# Patient Record
Sex: FEMALE | Race: BLACK OR AFRICAN AMERICAN | NOT HISPANIC OR LATINO | Employment: FULL TIME | URBAN - METROPOLITAN AREA
[De-identification: names, ages, dates, MRNs, and addresses within clinical notes are randomized per-mention and may not be internally consistent; named-entity substitution may affect disease eponyms.]

---

## 2023-10-11 PROBLEM — R11.2 POST-OPERATIVE NAUSEA AND VOMITING: Status: ACTIVE | Noted: 2023-10-11

## 2023-10-11 PROBLEM — G47.33 OBSTRUCTIVE SLEEP APNEA, ADULT: Status: ACTIVE | Noted: 2023-10-11

## 2023-10-11 PROBLEM — E86.0 DEHYDRATION: Status: ACTIVE | Noted: 2023-10-11

## 2023-10-11 PROBLEM — M62.830 BACK MUSCLE SPASM: Status: ACTIVE | Noted: 2023-10-11

## 2023-10-11 PROBLEM — R29.818 SUSPECTED SLEEP APNEA: Status: ACTIVE | Noted: 2023-10-11

## 2023-10-11 PROBLEM — J45.909 ASTHMA (HHS-HCC): Status: ACTIVE | Noted: 2023-10-11

## 2023-10-11 PROBLEM — E78.00 HYPERCHOLESTEROLEMIA: Status: ACTIVE | Noted: 2023-10-11

## 2023-10-11 PROBLEM — Z97.5 NEXPLANON IN PLACE: Status: ACTIVE | Noted: 2023-10-11

## 2023-10-11 PROBLEM — M62.838 NECK MUSCLE SPASM: Status: ACTIVE | Noted: 2023-10-11

## 2023-10-11 PROBLEM — F54 PSYCHOLOGICAL FACTOR AFFECTING PHYSICAL CONDITION: Status: ACTIVE | Noted: 2023-10-11

## 2023-10-11 PROBLEM — E53.8 FOLIC ACID DEFICIENCY: Status: ACTIVE | Noted: 2023-10-11

## 2023-10-11 PROBLEM — Z98.84 HISTORY OF BARIATRIC SURGERY: Status: ACTIVE | Noted: 2023-10-11

## 2023-10-11 PROBLEM — E66.01 MORBID OBESITY (MULTI): Status: ACTIVE | Noted: 2023-10-11

## 2023-10-11 PROBLEM — J30.2 SEASONAL ALLERGIC RHINITIS: Status: ACTIVE | Noted: 2023-10-11

## 2023-10-11 PROBLEM — L73.2 HIDRADENITIS SUPPURATIVA: Status: ACTIVE | Noted: 2023-10-11

## 2023-10-11 PROBLEM — R63.2 POLYPHAGIA: Status: ACTIVE | Noted: 2023-10-11

## 2023-10-11 PROBLEM — I10 HYPERTENSION: Status: ACTIVE | Noted: 2023-10-11

## 2023-10-11 PROBLEM — Z98.890 POST-OPERATIVE NAUSEA AND VOMITING: Status: ACTIVE | Noted: 2023-10-11

## 2023-10-11 PROBLEM — K21.9 GERD (GASTROESOPHAGEAL REFLUX DISEASE): Status: ACTIVE | Noted: 2023-10-11

## 2023-10-11 PROBLEM — E55.9 VITAMIN D DEFICIENCY: Status: ACTIVE | Noted: 2023-10-11

## 2023-10-11 RX ORDER — OMEPRAZOLE 40 MG/1
CAPSULE, DELAYED RELEASE ORAL
COMMUNITY
End: 2024-04-08 | Stop reason: WASHOUT

## 2023-10-11 RX ORDER — ENOXAPARIN SODIUM 100 MG/ML
INJECTION SUBCUTANEOUS
COMMUNITY
End: 2024-04-08 | Stop reason: ALTCHOICE

## 2023-10-11 RX ORDER — NAPROXEN 500 MG/1
500 TABLET ORAL 2 TIMES DAILY
COMMUNITY
Start: 2023-07-24 | End: 2024-04-08 | Stop reason: ALTCHOICE

## 2023-10-11 RX ORDER — MUPIROCIN 20 MG/G
OINTMENT TOPICAL 2 TIMES DAILY
COMMUNITY
Start: 2022-09-12 | End: 2024-04-08 | Stop reason: WASHOUT

## 2023-10-11 RX ORDER — LIDOCAINE 560 MG/1
PATCH PERCUTANEOUS; TOPICAL; TRANSDERMAL DAILY
COMMUNITY
Start: 2023-07-24 | End: 2024-04-08 | Stop reason: ALTCHOICE

## 2023-10-11 RX ORDER — ONDANSETRON 4 MG/1
TABLET, ORALLY DISINTEGRATING ORAL
COMMUNITY
End: 2024-04-08 | Stop reason: ALTCHOICE

## 2023-10-11 RX ORDER — CYCLOBENZAPRINE HCL 10 MG
10 TABLET ORAL EVERY 8 HOURS
COMMUNITY
Start: 2023-07-24 | End: 2024-04-08 | Stop reason: WASHOUT

## 2023-10-11 RX ORDER — ETONOGESTREL 68 MG/1
IMPLANT SUBCUTANEOUS
COMMUNITY

## 2023-10-12 ENCOUNTER — APPOINTMENT (OUTPATIENT)
Dept: DERMATOLOGY | Facility: CLINIC | Age: 28
End: 2023-10-12
Payer: COMMERCIAL

## 2024-04-08 ENCOUNTER — OFFICE VISIT (OUTPATIENT)
Dept: OBSTETRICS AND GYNECOLOGY | Facility: CLINIC | Age: 29
End: 2024-04-08
Payer: COMMERCIAL

## 2024-04-08 VITALS
SYSTOLIC BLOOD PRESSURE: 128 MMHG | HEIGHT: 66 IN | BODY MASS INDEX: 35.36 KG/M2 | DIASTOLIC BLOOD PRESSURE: 84 MMHG | WEIGHT: 220 LBS

## 2024-04-08 DIAGNOSIS — Z01.419 WELL WOMAN EXAM WITH ROUTINE GYNECOLOGICAL EXAM: Primary | ICD-10-CM

## 2024-04-08 DIAGNOSIS — N92.1 BREAKTHROUGH BLEEDING ON NEXPLANON: ICD-10-CM

## 2024-04-08 DIAGNOSIS — Z97.5 BREAKTHROUGH BLEEDING ON NEXPLANON: ICD-10-CM

## 2024-04-08 DIAGNOSIS — N89.8 VAGINAL DISCHARGE: ICD-10-CM

## 2024-04-08 PROCEDURE — 99395 PREV VISIT EST AGE 18-39: CPT | Performed by: OBSTETRICS & GYNECOLOGY

## 2024-04-08 PROCEDURE — 1036F TOBACCO NON-USER: CPT | Performed by: OBSTETRICS & GYNECOLOGY

## 2024-04-08 PROCEDURE — 3079F DIAST BP 80-89 MM HG: CPT | Performed by: OBSTETRICS & GYNECOLOGY

## 2024-04-08 PROCEDURE — 87205 SMEAR GRAM STAIN: CPT

## 2024-04-08 PROCEDURE — 3074F SYST BP LT 130 MM HG: CPT | Performed by: OBSTETRICS & GYNECOLOGY

## 2024-04-08 RX ORDER — POLYETHYLENE GLYCOL 3350 17 G/17G
POWDER, FOR SOLUTION ORAL
COMMUNITY
Start: 2022-12-19

## 2024-04-08 RX ORDER — GUAIFENESIN 1200 MG
650 TABLET, EXTENDED RELEASE 12 HR ORAL EVERY 6 HOURS
COMMUNITY
Start: 2018-11-05

## 2024-04-08 RX ORDER — ASPIRIN 325 MG
TABLET, DELAYED RELEASE (ENTERIC COATED) ORAL
COMMUNITY
Start: 2022-12-20

## 2024-04-08 ASSESSMENT — PAIN SCALES - GENERAL: PAINLEVEL: 0-NO PAIN

## 2024-04-08 NOTE — PROGRESS NOTES
History Of Present Illness  Routine Gyn Exam  Eileen Borjas here for routine WWE. Current complaints: On Nexplanon-- menses are a bit irregular. When she has a period it can last longer than a week.  When the menses lingers it can cause a foul odor. She is not currently sexually active. LMP 3/26/2024,     Gynecologic History  Patient's last menstrual period was 2024 (exact date).  Contraception:  Nexplanon  Last Pap: 2022. Results were: normal  Last mammogram: NA. Results were: NA  Last Colonoscopy:  NA. Results were: NA  Last DEXA: NA. Results were: NA  Lipid/DM: with PCP (last 2022).    Obstetric History  OB History    Para Term  AB Living   1 1 1     1   SAB IAB Ectopic Multiple Live Births           1      # Outcome Date GA Lbr Dc/2nd Weight Sex Delivery Anes PTL Lv   1 Term 2018    F CS-LTranv   NAE        Past Medical History  She has a past medical history of 28 weeks gestation of pregnancy (2018), 34 weeks gestation of pregnancy (10/08/2018), Acute candidiasis of vulva and vagina (10/08/2018), Acute pharyngitis, unspecified (2017), Bariatric surgery status (2022), Encounter for contraceptive management, unspecified (2015), Encounter for examination of blood pressure without abnormal findings (10/01/2018), Encounter for other general counseling and advice on contraception (2017), Encounter for routine postpartum follow-up, Encounter for screening for diabetes mellitus (2018), Encounter for screening for infections with a predominantly sexual mode of transmission (2018), Encounter for supervision of normal first pregnancy, second trimester, Other conditions influencing health status, Other problems related to lifestyle (2015), Other problems related to lifestyle (2018), Otitis media, unspecified, right ear (2017), Personal history of other diseases of the female genital tract, Personal history of other diseases of the  respiratory system (02/21/2017), Personal history of other diseases of the respiratory system (02/21/2017), Personal history of other diseases of the respiratory system, Personal history of other infectious and parasitic diseases (01/31/2014), Personal history of other specified conditions (01/27/2014), Personal history of other specified conditions (01/27/2014), Streptococcal pharyngitis (09/06/2016), Supervision of other high risk pregnancies, third trimester (09/20/2018), and Unspecified abnormal findings in urine (01/27/2014).    Surgical History  She has a past surgical history that includes Other surgical history (06/25/2021); Tonsillectomy (11/03/2014); and Bariatric Surgery (2022).     Social History  She reports that she has never smoked. She has never used smokeless tobacco. She reports current alcohol use. She reports that she does not use drugs.    Family History  No family history on file.     Allergies  Patient has no known allergies.     Chaperone: present  Physical Exam  Constitutional:       Appearance: Normal appearance.   Pulmonary:      Effort: Pulmonary effort is normal.   Chest:   Breasts:     Right: Normal. No mass, nipple discharge or skin change.      Left: Normal. No mass, nipple discharge or skin change.   Abdominal:      General: Abdomen is flat. There is no distension.      Palpations: Abdomen is soft.      Tenderness: There is no abdominal tenderness.   Genitourinary:     Labia:         Right: No rash, tenderness or lesion.         Left: No rash, tenderness or lesion.       Urethra: No prolapse.      Vagina: Vaginal discharge (thcik and white) present. No bleeding.      Cervix: No friability or lesion.      Uterus: Normal. Not enlarged and not tender.       Adnexa:         Right: No mass, tenderness or fullness.          Left: No mass, tenderness or fullness.     Neurological:      General: No focal deficit present.      Mental Status: She is alert.   Psychiatric:         Mood and  "Affect: Mood normal.          Last Recorded Vitals  Blood pressure 128/84, height 1.676 m (5' 6\"), weight 99.8 kg (220 lb), last menstrual period 03/26/2024.    Assessment/Plan   Problem List Items Addressed This Visit    None  Visit Diagnoses       Well woman exam with routine gynecological exam    -  Primary    Breakthrough bleeding on Nexplanon        Vaginal discharge        Relevant Orders    Vaginitis Gram Stain For Bacterial Vaginosis + Yeast            Contraception: Nexplanon  PAP: due 8/2025    Mammogram: NA   Colonoscopy:  NA   DEXA: NA   Lipid/DM: obtained through PCP    Discussed symptoms of vaginal odor and discharge noted on today's exam.  Will send vaginitis panel. Will treat if positive. If negative, will cycle for one month with a AFUA given her frustration with her current bleeding pattern.  We did discuss the common changes in bleeding that can occur with both Nexplanon and rapid weight loss (pt has lost ~150 lbs since her bariatric surgery).       Cr Zhu MD  "

## 2024-04-10 DIAGNOSIS — Z97.5 BREAKTHROUGH BLEEDING ON NEXPLANON: Primary | ICD-10-CM

## 2024-04-10 DIAGNOSIS — N92.1 BREAKTHROUGH BLEEDING ON NEXPLANON: Primary | ICD-10-CM

## 2024-04-10 LAB
CLUE CELLS VAG LPF-#/AREA: NORMAL /[LPF]
NUGENT SCORE: 3
YEAST VAG WET PREP-#/AREA: NORMAL

## 2024-04-10 RX ORDER — NORGESTIMATE AND ETHINYL ESTRADIOL 0.25-0.035
1 KIT ORAL DAILY
Qty: 28 TABLET | Refills: 0 | Status: SHIPPED | OUTPATIENT
Start: 2024-04-10 | End: 2024-05-07

## 2024-04-11 ENCOUNTER — OFFICE VISIT (OUTPATIENT)
Dept: PLASTIC SURGERY | Facility: CLINIC | Age: 29
End: 2024-04-11
Payer: COMMERCIAL

## 2024-04-11 VITALS
HEART RATE: 73 BPM | BODY MASS INDEX: 35.36 KG/M2 | DIASTOLIC BLOOD PRESSURE: 90 MMHG | SYSTOLIC BLOOD PRESSURE: 133 MMHG | HEIGHT: 66 IN | WEIGHT: 220 LBS

## 2024-04-11 DIAGNOSIS — E65 ABDOMINAL PANNUS: ICD-10-CM

## 2024-04-11 DIAGNOSIS — E88.1 LIPODYSTROPHY: Primary | ICD-10-CM

## 2024-04-11 PROCEDURE — 3075F SYST BP GE 130 - 139MM HG: CPT | Performed by: PHYSICIAN ASSISTANT

## 2024-04-11 PROCEDURE — 3080F DIAST BP >= 90 MM HG: CPT | Performed by: PHYSICIAN ASSISTANT

## 2024-04-11 PROCEDURE — 99204 OFFICE O/P NEW MOD 45 MIN: CPT | Performed by: PHYSICIAN ASSISTANT

## 2024-04-15 NOTE — PROGRESS NOTES
Department of Plastic and Reconstructive Surgery       Initial Office Consultation    Date: 24  Referring Provider: Dr. Moscoso  Primary Care Provider: Jenn Mckeon MD    Porfirio Borjas is a 28 y.o. female who was referred by Dr Moscoso  for evaluation of excess skin and tissue s/p bariatric surgery.    She is a relatively healthy 29yo female who presents at the recommendation of her bariatrics doctor for consultation regarding excess skin and tissue s/p bariatric surgery. She underwent gastric sleeve 3/2022. She notes her highest weight prior to surgery was 385lbs, lowest after surgery 215lbs, and her current weight 220lbs. She endorses that she has been stable at her current weight for approx 3-4 months. She notes that she does not have a specific weight loss goal in mind however she notes she is not actively trying to lose any further weight at this time and is stabilized.   She has complaints of excess skin of the abdomen and arms. She notes that the excess skin of her arms and arm pits is causing her to have intermittent boils and rashes from the skin touching each other. She attempted to wear garments to keep the skin  however this worsens the rashes. Her abdomen has excess skin and tissue that is causing pain at her C-sections scar, rashes of the skin fold of the abdomen, and interferes with her ability to move in certain ways because her abdomen gets in the way.     PMH: HTN-untreated, managing with lifestyle modifications  Surgical Hx: tonsillectomy, gastric sleeve 3/2022,   Family Hx: non-contributory  Smoking: none      Review of Systems   All other systems have been reviewed with the patient and have been found to be negative with exception to the chief complaint as mentioned in the history of present illness.    ROS: As noted in history of present illness    - CONSTITUTIONAL: Denies weight loss, fever and chills.  - HEENT: Denies changes in vision and hearing.  -  RESPIRATORY: Denies SOB and cough, difficulty breathing  - CV: Denies palpitations and CP  - GI: Denies abdominal pain, nausea, vomiting and diarrhea.  - : Denies dysuria and urinary frequency.  - MSK: Denies myalgia and joint pain.  - SKIN: positive for intermittent redness and rashes of the skin of the abdomen  - NEUROLOGICAL: Denies headache and syncope.    Objective   Vital Signs:   Vitals:    04/11/24 1058   BP: 133/90   Pulse: 73       Gen: interactive and pleasant  Head: NCAT  Eyes: EOMI, PERRLA  Mouth: MMM  Throat: trachea midline  Cor: RRR  Pulm: nonlabored breathing  Abd: s/nt/nd  Neuro: AAOx3  Ext: extremities perfused    Focused exam of the: abdomen  Abdomen shows excess adiposity and skin with overhang grade 3 pannus that obstructs the genitalia. No rectus diastases is appreciated, no ventral hernias appreciated.  Exam is limited by patient body habitus.  There is excess skin in the horizontal and vertical dimensions with laxity. There are 2 rolls of skin present.     Arms: there is significant excess skin and tissue of the posterior arm and axilla that causes maceration of the skin from skin on skin contact.     Imaging  Ct Abd/PEL 1/27/22  IMPRESSION:  No CT evidence of an acute intra-abdominal or pelvic process.    Assessment/Plan     Eileen Borjas is a 28 y.o. femalewho was referred by Dr Moscoso for evaluation of excess skin and tissue s/p bariatric surgery.    She presents for consultation regarding excess skin and tissue of the abdomen and arms after gastric sleeve surgery. She has successfully lost 165lbs and has maintained stable weight. The patient has a hanging apron of skin and fat below the waist.  This extra skin is causing rashes  and affects the patient's ability to complete activities of daily living such as grooming and dressing. Abdominal panniculectomy would improve and restore these things. She has grade 3 pannus on exam that obstructs the genitalia. She additionally has excess  skin laxity in the vertical dimension. There are 2 rolls of skin present. She has not been treated for these rashes, I recommended follow up with PCP for treatment of rashes and skin irritation.     She additionally endorses excess skin of the arms. There is significant excess skin and tissue of the posterior arms that is causing skin on skin contact and skin irritation. She endorses boils and cysts of the skin of the arms and axillary region. She is to follow up with her PCP for continued management of these intermittent rashes.     Plan:   Follow up with PCP for rashes, she is to follow up with our office after treatment of rashes  Follow up with Dr Magana after treatment of rashes    I spent 45 minutes with this patient. Greater than 50% of this time was spent in the counselling and/or coordination of care of this patient.  This note was created using voice recognition software and was not corrected for typographical or grammatical errors.    Signature: Caryl Chirinos PA-C  Date: 4/11/24

## 2024-04-30 ENCOUNTER — APPOINTMENT (OUTPATIENT)
Dept: PRIMARY CARE | Facility: CLINIC | Age: 29
End: 2024-04-30
Payer: COMMERCIAL

## 2024-04-30 ENCOUNTER — OFFICE VISIT (OUTPATIENT)
Dept: PRIMARY CARE | Facility: CLINIC | Age: 29
End: 2024-04-30
Payer: COMMERCIAL

## 2024-04-30 VITALS
HEART RATE: 78 BPM | SYSTOLIC BLOOD PRESSURE: 134 MMHG | DIASTOLIC BLOOD PRESSURE: 82 MMHG | HEIGHT: 66 IN | OXYGEN SATURATION: 99 % | WEIGHT: 212 LBS | BODY MASS INDEX: 34.07 KG/M2

## 2024-04-30 DIAGNOSIS — E78.2 MIXED HYPERLIPIDEMIA: Primary | ICD-10-CM

## 2024-04-30 DIAGNOSIS — R23.8 SKIN IRRITATION: ICD-10-CM

## 2024-04-30 PROCEDURE — 1036F TOBACCO NON-USER: CPT | Performed by: STUDENT IN AN ORGANIZED HEALTH CARE EDUCATION/TRAINING PROGRAM

## 2024-04-30 PROCEDURE — 99214 OFFICE O/P EST MOD 30 MIN: CPT | Performed by: STUDENT IN AN ORGANIZED HEALTH CARE EDUCATION/TRAINING PROGRAM

## 2024-04-30 PROCEDURE — 3008F BODY MASS INDEX DOCD: CPT | Performed by: STUDENT IN AN ORGANIZED HEALTH CARE EDUCATION/TRAINING PROGRAM

## 2024-04-30 PROCEDURE — 3079F DIAST BP 80-89 MM HG: CPT | Performed by: STUDENT IN AN ORGANIZED HEALTH CARE EDUCATION/TRAINING PROGRAM

## 2024-04-30 PROCEDURE — 3075F SYST BP GE 130 - 139MM HG: CPT | Performed by: STUDENT IN AN ORGANIZED HEALTH CARE EDUCATION/TRAINING PROGRAM

## 2024-04-30 RX ORDER — HYDROCORTISONE 25 MG/G
CREAM TOPICAL 2 TIMES DAILY PRN
Qty: 30 G | Refills: 2 | Status: SHIPPED | OUTPATIENT
Start: 2024-04-30 | End: 2025-04-30

## 2024-04-30 ASSESSMENT — PATIENT HEALTH QUESTIONNAIRE - PHQ9
1. LITTLE INTEREST OR PLEASURE IN DOING THINGS: NOT AT ALL
SUM OF ALL RESPONSES TO PHQ9 QUESTIONS 1 AND 2: 0
2. FEELING DOWN, DEPRESSED OR HOPELESS: NOT AT ALL

## 2024-04-30 NOTE — PROGRESS NOTES
"Subjective   Patient ID: Eileen Borjas is a 28 y.o. female who presents for Rash.    HPI   Eileen is a 27 yo female who presents for skin irritation. Patient has a hx s/f bariatric surgery with 165 lbs weight loss. Since then she has had excessive skin on her arms as well as pannus. She had a plastic surgery consultation to discuss skin removal. Patient is here because she has been having skin irritation at her distal posterior arm where the skin rubs together. 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 Has associated back and shoulder pain. When skin gets irritated, becomes erythematous and dry.     Patient is considering surrogacy, so is leaning against panniculectomy at this time. Has 1 daughter, 4yo.     Review of Systems  Otherwise negative unless stated in above HPI.     Objective   /82   Pulse 78   Ht 1.676 m (5' 6\")   Wt 96.2 kg (212 lb)   LMP 03/26/2024 (Exact Date)   SpO2 99%   BMI 34.22 kg/m²     Physical Exam  Constitutional:       General: She is not in acute distress.     Appearance: Normal appearance. She is obese.   HENT:      Head: Normocephalic and atraumatic.   Cardiovascular:      Rate and Rhythm: Normal rate and regular rhythm.      Heart sounds: No murmur heard.  Pulmonary:      Effort: Pulmonary effort is normal.      Breath sounds: Normal breath sounds. No wheezing, rhonchi or rales.   Skin:     General: Skin is warm and dry.      Findings: No rash.      Comments: Excess skin b/l upper arms.    Neurological:      General: No focal deficit present.      Mental Status: She is alert.   Psychiatric:         Mood and Affect: Mood normal.         Behavior: Behavior normal.         Assessment/Plan   Eileen is a very pleasant 27 yo female with hx s/f bariatric surgery (with 165 lbs weight loss) who presents with complaint of skin irritation under her arms bilaterally.     Problem List Items Addressed This Visit         Codes    Mixed hyperlipidemia    -  Primary  Patient had " elevated Tchol and LDL on lipid panel prior to weight loss. We will recheck lipid panel today and follow up with results.    E78.2    Relevant Orders    Lipid Panel    Comprehensive metabolic panel    BMI 34.0-34.9,adult     Z68.34    Relevant Orders    CBC    Skin irritation      No irritation or rash visualized today on exam, however, patient reports significant, dry, erythematous irritation daily. We will prescribe topical hydrocortisone 2.5% to use BID prn for the irritation.    R23.8    Relevant Medications    hydrocortisone 2.5 % cream     No red flags. All questions and concerns addressed. Patient agreeable to the established plan of care.     Patient was seen and discussed with attending physician Dr. Mckeon.  Erin Warren, DO  Family Medicine, PGY-2

## 2024-05-07 DIAGNOSIS — Z97.5 BREAKTHROUGH BLEEDING ON NEXPLANON: ICD-10-CM

## 2024-05-07 DIAGNOSIS — N92.1 BREAKTHROUGH BLEEDING ON NEXPLANON: ICD-10-CM

## 2024-05-07 RX ORDER — NORGESTIMATE AND ETHINYL ESTRADIOL 0.25-0.035
1 KIT ORAL DAILY
Qty: 28 TABLET | Refills: 0 | Status: SHIPPED | OUTPATIENT
Start: 2024-05-07

## 2024-06-10 ENCOUNTER — APPOINTMENT (OUTPATIENT)
Dept: PLASTIC SURGERY | Facility: CLINIC | Age: 29
End: 2024-06-10
Payer: COMMERCIAL

## 2024-06-24 ENCOUNTER — APPOINTMENT (OUTPATIENT)
Dept: PLASTIC SURGERY | Facility: CLINIC | Age: 29
End: 2024-06-24
Payer: COMMERCIAL

## 2025-04-25 ENCOUNTER — APPOINTMENT (OUTPATIENT)
Dept: PRIMARY CARE | Facility: CLINIC | Age: 30
End: 2025-04-25
Payer: COMMERCIAL

## 2025-04-25 VITALS
BODY MASS INDEX: 40.34 KG/M2 | SYSTOLIC BLOOD PRESSURE: 140 MMHG | DIASTOLIC BLOOD PRESSURE: 98 MMHG | OXYGEN SATURATION: 95 % | WEIGHT: 251 LBS | HEART RATE: 89 BPM | HEIGHT: 66 IN

## 2025-04-25 DIAGNOSIS — K21.9 GASTROESOPHAGEAL REFLUX DISEASE WITHOUT ESOPHAGITIS: ICD-10-CM

## 2025-04-25 DIAGNOSIS — E66.01 MORBID OBESITY (MULTI): ICD-10-CM

## 2025-04-25 DIAGNOSIS — L67.9 ABNORMALITY OF HAIR GROWTH: ICD-10-CM

## 2025-04-25 DIAGNOSIS — E55.9 VITAMIN D DEFICIENCY: ICD-10-CM

## 2025-04-25 DIAGNOSIS — I73.00 RAYNAUD'S PHENOMENON WITHOUT GANGRENE: ICD-10-CM

## 2025-04-25 DIAGNOSIS — R73.9 HYPERGLYCEMIA: ICD-10-CM

## 2025-04-25 DIAGNOSIS — I10 HYPERTENSION, UNSPECIFIED TYPE: Primary | ICD-10-CM

## 2025-04-25 DIAGNOSIS — E78.2 MIXED HYPERLIPIDEMIA: ICD-10-CM

## 2025-04-25 PROCEDURE — 3077F SYST BP >= 140 MM HG: CPT | Performed by: INTERNAL MEDICINE

## 2025-04-25 PROCEDURE — 3008F BODY MASS INDEX DOCD: CPT | Performed by: INTERNAL MEDICINE

## 2025-04-25 PROCEDURE — 3080F DIAST BP >= 90 MM HG: CPT | Performed by: INTERNAL MEDICINE

## 2025-04-25 PROCEDURE — 99214 OFFICE O/P EST MOD 30 MIN: CPT | Performed by: INTERNAL MEDICINE

## 2025-04-25 RX ORDER — ERGOCALCIFEROL 1.25 MG/1
1.25 CAPSULE ORAL WEEKLY
Qty: 12 CAPSULE | Refills: 0 | Status: SHIPPED | OUTPATIENT
Start: 2025-04-25 | End: 2025-07-18

## 2025-04-25 RX ORDER — ORLISTAT 120 MG/1
120 CAPSULE ORAL
Qty: 90 CAPSULE | Refills: 11 | Status: SHIPPED | OUTPATIENT
Start: 2025-04-25 | End: 2026-04-25

## 2025-04-25 RX ORDER — AMLODIPINE BESYLATE 5 MG/1
5 TABLET ORAL DAILY
Qty: 30 TABLET | Refills: 1 | Status: SHIPPED | OUTPATIENT
Start: 2025-04-25 | End: 2025-10-22

## 2025-04-25 ASSESSMENT — PATIENT HEALTH QUESTIONNAIRE - PHQ9
2. FEELING DOWN, DEPRESSED OR HOPELESS: NOT AT ALL
1. LITTLE INTEREST OR PLEASURE IN DOING THINGS: NOT AT ALL
SUM OF ALL RESPONSES TO PHQ9 QUESTIONS 1 AND 2: 0

## 2025-04-25 ASSESSMENT — ENCOUNTER SYMPTOMS
DEPRESSION: 0
OCCASIONAL FEELINGS OF UNSTEADINESS: 0
LOSS OF SENSATION IN FEET: 0

## 2025-04-25 NOTE — ASSESSMENT & PLAN NOTE
She is started on vitamin D 50,000 units weekly and will monitor lipid panel.  Orders:    Vitamin D 25-Hydroxy,Total (for eval of Vitamin D levels); Future    ergocalciferol (Vitamin D-2) 1250 mcg (50,000 units) capsule; Take 1 capsule (1.25 mg) by mouth 1 (one) time per week.

## 2025-04-25 NOTE — PROGRESS NOTES
"Subjective   Patient ID: Eileen Borjas is a 29 y.o. female who presents for New Patient Visit.    HPI patient presents to clinic to establish care.  She had been a former patient of  Dr. Mckeon.  She is complaining of hair growth around her face for the past 1 year, Raynaud's phenomenon  also going on for the last 1 year and she is requesting a weight loss medication.  She denies any swelling of the fingers, arthralgia, fever, rashes, menorrhagia, abdominal pain.,  She had sleeve gastrectomy in 2022 and since then she has lost approximately 100 pounds.  Headache.  Shortness of breath.  Hair growth x 1 year,lost 100 pounds c/o raynaud's phenomena   Past medical history significant for obesity,dyslipidemia, hypertension, suspected obstructive sleep apnea, and chronic constipation, vitamin d deficiency,  Past surgical history notable for s/p laparoscopic sleeve gastrectomy 3/2/22, and tonsillectomy .  Review of Systems   Constitutional: Negative.    HENT: Negative.     Eyes: Negative.    Respiratory: Negative.     Cardiovascular: Negative.         With blanching of the fingers   Gastrointestinal: Negative.    Endocrine: Negative.         With excessive hair growth   Genitourinary: Negative.    Musculoskeletal: Negative.    Skin: Negative.    Allergic/Immunologic: Negative.    Neurological: Negative.    Hematological: Negative.    Psychiatric/Behavioral: Negative.         Objective   BP (!) 140/98 (BP Location: Left arm, Patient Position: Sitting)   Pulse 89   Ht 1.676 m (5' 6\")   Wt 114 kg (251 lb)   SpO2 95%   BMI 40.51 kg/m²     Physical Exam  Constitutional:       Appearance: Normal appearance. She is obese.   HENT:      Right Ear: Tympanic membrane normal.      Left Ear: Tympanic membrane and ear canal normal.      Nose: Nose normal.   Neck:      Vascular: No carotid bruit.   Cardiovascular:      Rate and Rhythm: Normal rate.   Pulmonary:      Effort: No respiratory distress.      Breath sounds: No stridor. No " wheezing.   Abdominal:      Palpations: Abdomen is soft.      Tenderness: There is no abdominal tenderness. There is no guarding or rebound.   Skin:     Coloration: Skin is not jaundiced.      Findings: No bruising.      Comments: Increased hair growth noticed near chin area    Neurological:      General: No focal deficit present.      Mental Status: She is alert and oriented to person, place, and time.   Psychiatric:         Mood and Affect: Mood normal.         Assessment/Plan   Assessment & Plan  Hypertension, unspecified type  Patient will be started on amlodipine regarding elevated blood pressure.  She is also advised to cut down on salt intake.  Orders:    amLODIPine (Norvasc) 5 mg tablet; Take 1 tablet (5 mg) by mouth once daily.    Basic metabolic panel; Future    Urinalysis with Reflex Microscopic; Future    Vitamin D deficiency  She is started on vitamin D 50,000 units weekly and will monitor lipid panel.  Orders:    Vitamin D 25-Hydroxy,Total (for eval of Vitamin D levels); Future    ergocalciferol (Vitamin D-2) 1250 mcg (50,000 units) capsule; Take 1 capsule (1.25 mg) by mouth 1 (one) time per week.    Morbid obesity (Multi)  She is advised to follow Mediterranean diet, exercise and is given trial with orlistat regarding obesity.  Orders:    orlistat (XenicaL) 120 mg capsule; Take 1 capsule (120 mg) by mouth 3 times daily (morning, midday, late afternoon).    CBC and Auto Differential; Future    Gastroesophageal reflux disease without esophagitis  She is advised to take Pepcid regarding her symptoms of GERD.  Orders:    CBC and Auto Differential; Future    Raynaud's phenomenon without gangrene  She will be further evaluated regarding her Raynaud's phenomena and is given trial with amlodipine.  Orders:    amLODIPine (Norvasc) 5 mg tablet; Take 1 tablet (5 mg) by mouth once daily.    TSH; Future    Cryoglobulin; Future    RICKY; Future    Anti-SSA; Future    Follow Up In Primary Care - Established;  Future    Mixed hyperlipidemia  She is advised to follow low-cholesterol diet and will monitor lipid panel.  Orders:    Lipid Panel; Future    Hyperglycemia  We will monitor serum glucose and hemoglobin A1c.  Orders:    Hemoglobin A1c; Future    Abnormality of hair growth  We will check LH FSH and testosterone level regarding excessive hair growth on her face.  Orders:    Luteinizing hormone; Future    FSH; Future    Testosterone, total and free; Future

## 2025-04-25 NOTE — ASSESSMENT & PLAN NOTE
Patient will be started on amlodipine regarding elevated blood pressure.  She is also advised to cut down on salt intake.  Orders:    amLODIPine (Norvasc) 5 mg tablet; Take 1 tablet (5 mg) by mouth once daily.    Basic metabolic panel; Future    Urinalysis with Reflex Microscopic; Future

## 2025-04-25 NOTE — ASSESSMENT & PLAN NOTE
She is advised to take Pepcid regarding her symptoms of GERD.  Orders:    CBC and Auto Differential; Future

## 2025-04-25 NOTE — ASSESSMENT & PLAN NOTE
She is advised to follow Mediterranean diet, exercise and is given trial with orlistat regarding obesity.  Orders:    orlistat (XenicaL) 120 mg capsule; Take 1 capsule (120 mg) by mouth 3 times daily (morning, midday, late afternoon).    CBC and Auto Differential; Future

## 2025-04-26 ASSESSMENT — ENCOUNTER SYMPTOMS
ENDOCRINE NEGATIVE: 1
RESPIRATORY NEGATIVE: 1
NEUROLOGICAL NEGATIVE: 1
ALLERGIC/IMMUNOLOGIC NEGATIVE: 1
GASTROINTESTINAL NEGATIVE: 1
PSYCHIATRIC NEGATIVE: 1
EYES NEGATIVE: 1
HEMATOLOGIC/LYMPHATIC NEGATIVE: 1
CONSTITUTIONAL NEGATIVE: 1
CARDIOVASCULAR NEGATIVE: 1
MUSCULOSKELETAL NEGATIVE: 1

## 2025-04-28 DIAGNOSIS — E66.01 MORBID OBESITY (MULTI): ICD-10-CM

## 2025-04-28 RX ORDER — ORLISTAT 120 MG/1
120 CAPSULE ORAL
Qty: 90 CAPSULE | Refills: 11 | Status: SHIPPED | OUTPATIENT
Start: 2025-04-28 | End: 2025-04-28

## 2025-04-28 RX ORDER — ORLISTAT 120 MG/1
120 CAPSULE ORAL
Qty: 90 CAPSULE | Refills: 1 | Status: SHIPPED | OUTPATIENT
Start: 2025-04-28 | End: 2025-10-25

## 2025-05-13 DIAGNOSIS — E55.9 VITAMIN D DEFICIENCY: Primary | ICD-10-CM

## 2025-05-13 DIAGNOSIS — E66.01 MORBID OBESITY (MULTI): ICD-10-CM

## 2025-05-13 DIAGNOSIS — I10 HYPERTENSION, UNSPECIFIED TYPE: ICD-10-CM

## 2025-05-13 DIAGNOSIS — I73.00 RAYNAUD'S PHENOMENON WITHOUT GANGRENE: ICD-10-CM

## 2025-05-14 RX ORDER — AMLODIPINE BESYLATE 5 MG/1
5 TABLET ORAL DAILY
Qty: 30 TABLET | Refills: 1 | Status: SHIPPED | OUTPATIENT
Start: 2025-05-14 | End: 2025-11-10

## 2025-05-14 RX ORDER — ORLISTAT 120 MG/1
120 CAPSULE ORAL
Qty: 90 CAPSULE | Refills: 1 | Status: SHIPPED | OUTPATIENT
Start: 2025-05-14 | End: 2025-11-10

## 2025-05-14 RX ORDER — ASPIRIN 325 MG
1.25 TABLET, DELAYED RELEASE (ENTERIC COATED) ORAL WEEKLY
Qty: 12 CAPSULE | Refills: 1 | Status: SHIPPED | OUTPATIENT
Start: 2025-05-14

## 2025-06-06 ENCOUNTER — APPOINTMENT (OUTPATIENT)
Dept: PRIMARY CARE | Facility: CLINIC | Age: 30
End: 2025-06-06
Payer: COMMERCIAL